# Patient Record
Sex: MALE | Race: BLACK OR AFRICAN AMERICAN | Employment: UNEMPLOYED | URBAN - METROPOLITAN AREA
[De-identification: names, ages, dates, MRNs, and addresses within clinical notes are randomized per-mention and may not be internally consistent; named-entity substitution may affect disease eponyms.]

---

## 2018-11-11 ENCOUNTER — RADIANT APPOINTMENT (OUTPATIENT)
Dept: GENERAL RADIOLOGY | Facility: CLINIC | Age: 23
End: 2018-11-11
Attending: FAMILY MEDICINE
Payer: COMMERCIAL

## 2018-11-11 ENCOUNTER — OFFICE VISIT (OUTPATIENT)
Dept: URGENT CARE | Facility: URGENT CARE | Age: 23
End: 2018-11-11
Payer: COMMERCIAL

## 2018-11-11 VITALS
HEART RATE: 76 BPM | TEMPERATURE: 98.1 F | WEIGHT: 244 LBS | SYSTOLIC BLOOD PRESSURE: 130 MMHG | DIASTOLIC BLOOD PRESSURE: 80 MMHG | BODY MASS INDEX: 40.6 KG/M2 | RESPIRATION RATE: 16 BRPM

## 2018-11-11 DIAGNOSIS — G89.29 WRIST PAIN, CHRONIC, LEFT: ICD-10-CM

## 2018-11-11 DIAGNOSIS — G89.29 WRIST PAIN, CHRONIC, LEFT: Primary | ICD-10-CM

## 2018-11-11 DIAGNOSIS — M25.532 WRIST PAIN, CHRONIC, LEFT: Primary | ICD-10-CM

## 2018-11-11 DIAGNOSIS — M25.532 WRIST PAIN, CHRONIC, LEFT: ICD-10-CM

## 2018-11-11 PROCEDURE — 99213 OFFICE O/P EST LOW 20 MIN: CPT | Performed by: FAMILY MEDICINE

## 2018-11-11 PROCEDURE — 73110 X-RAY EXAM OF WRIST: CPT | Mod: LT

## 2018-11-11 RX ORDER — NAPROXEN 500 MG/1
500 TABLET ORAL 2 TIMES DAILY PRN
Qty: 30 TABLET | Refills: 1 | Status: SHIPPED | OUTPATIENT
Start: 2018-11-11

## 2018-11-11 NOTE — PROGRESS NOTES
SUBJECTIVE:  Chief Complaint   Patient presents with     Wrist Injury     lt wrist pain for past month,no recent injury known     Jhonny Mishra is a 22 year old male who presents with a chief complaint of left wrist pain and tenderness.  Symptoms began 1 month(s) ago, are moderate and gradual onset and still present  Context:  Injury:No.  Pain exacerbated by flexion/extension Relieved by rest.  He treated it initially with no therapy. This is not the first time this type of injury has occurred to this patient as he had a fracture as a child    Past Medical History:   Diagnosis Date     Autism spectrum      Iron deficiency anemia, unspecified     Anemia, iron def..  Resolved 11.0 2/02.     Other developmental speech or language disorder     speech delay.  Normal hearing [facility].     Current Outpatient Prescriptions   Medication Sig Dispense Refill     Sertraline HCl (ZOLOFT PO) Take 150 mg by mouth daily       guaiFENesin-dextromethorphan (ROBITUSSIN DM) 100-10 MG/5ML syrup Take 10 mLs by mouth every 6 hours as needed for cough (Patient not taking: Reported on 11/11/2018) 200 mL 0     ibuprofen (ADVIL/MOTRIN) 800 MG tablet Take 1 tablet (800 mg) by mouth every 8 hours as needed for moderate pain (Patient not taking: Reported on 11/11/2018) 30 tablet 0     Social History   Substance Use Topics     Smoking status: Never Smoker     Smokeless tobacco: Never Used     Alcohol use Not on file     ROS:  CONSTITUTIONAL:NEGATIVE for fever, chills, change in weight  INTEGUMENTARY/SKIN: NEGATIVE for worrisome rashes, moles or lesions    EXAM:   /80 (Cuff Size: Adult Large)  Pulse 76  Temp 98.1  F (36.7  C) (Oral)  Resp 16  Wt 244 lb (110.7 kg)  BMI 40.6 kg/m2  M/S Exam:L wrist mild tenderness to palpation and FROM   GENERAL APPEARANCE: healthy, alert and no distress  EXTREMITIES: peripheral pulses normal  SKIN: no suspicious lesions or rashes  NEURO: Normal strength and tone, sensory exam grossly  normal, mentation intact and speech normal    X-RAY was done.  I have personally reviewed the images and find nothing acute.    Radiology to review xrays and I will communicate new findings     ASSESSMENT:  1. Wrist pain, chronic, left  Symptomatic cares were discussed in detail.   Pt instructed to come back to the clinic for worsening sx    - XR Wrist Left G/E 3 Views; Future  - naproxen (NAPROSYN) 500 MG tablet; Take 1 tablet (500 mg) by mouth 2 times daily as needed for moderate pain (with food)  Dispense: 30 tablet; Refill: 1  - order for DME; Equipment being ordered: wrist splint  Dispense: 1 each; Refill: 0

## 2018-11-11 NOTE — MR AVS SNAPSHOT
After Visit Summary   11/11/2018    Jhonny Mishra    MRN: [id number]           Patient Information     Date Of Birth          1995        Visit Information        Provider Department      11/11/2018 4:20 PM Michael Blanchard MD [facility]        Today's Diagnoses     Wrist pain, chronic, left    -  1       Follow-ups after your visit        Who to contact     If you have questions or need follow up information about today's clinic visit or your schedule please contact [facility] directly at [phone].  Normal or non-critical lab and imaging results will be communicated to you by MyChart, letter or phone within 4 business days after the clinic has received the results. If you do not hear from us within 7 days, please contact the clinic through MyChart or phone. If you have a critical or abnormal lab result, we will notify you by phone as soon as possible.  Submit refill requests through [employer] or call your pharmacy and they will forward the refill request to us. Please allow 3 business days for your refill to be completed.          Additional Information About Your Visit        Care EveryWhere ID     This is your Care EveryWhere ID. This could be used by other organizations to access your [place] medical records  [id number]        Your Vitals Were     Pulse Temperature Respirations BMI (Body Mass Index)          76 98.1  F (36.7  C) (Oral) 16 40.6 kg/m2         Blood Pressure from Last 3 Encounters:   11/11/18 130/80   12/17/16 92/48   10/03/14 119/77    Weight from Last 3 Encounters:   11/11/18 244 lb (110.7 kg)   12/17/16 234 lb 1.6 oz (106.2 kg) (98 %)*   03/26/14 231 lb 4.2 oz (104.9 kg) (>99 %)*     * Growth percentiles are based on CDC 2-20 Years data.                 Today's Medication Changes          These changes are accurate as of 11/11/18  6:25 PM.  If you have any questions, ask your nurse or doctor.                Start taking these medicines.        Dose/Directions    naproxen 500 MG tablet   Commonly known as:  NAPROSYN   Used for:  Wrist pain, chronic, left   Started by:  Michael Blancahrd MD        Dose:  500 mg   Take 1 tablet (500 mg) by mouth 2 times daily as needed for moderate pain (with food)   Quantity:  30 tablet   Refills:  1       order for DME   Used for:  Wrist pain, chronic, left   Started by:  Michael Blanchard MD        Equipment being ordered: wrist splint   Quantity:  1 each   Refills:  0            Where to get your medicines      These medications were sent to [facility] [address] [address]     Phone:  [phone]     naproxen 500 MG tablet         Some of these will need a paper prescription and others can be bought over the counter.  Ask your nurse if you have questions.     Bring a paper prescription for each of these medications     order for DME                Primary Care Provider Office Phone # Fax #    [facility] [phone]       [address],  [place] [address]        Equal Access to Services     [facility] : Hadii cynthia ku hadasho Soomaali, waaxda luqadaha, qaybta kaalmada adeegyada, sharmila stearns haysindhu carrero . So [facility] [phone].    ATENCIÓN: Si habla español, tiene a linder disposición servicios gratuitos de asistencia lingüística. Llame al [phone].    We comply with applicable federal civil rights laws and [place] laws. We do not discriminate on the basis of race, color, national origin, age, disability, sex, sexual orientation, or gender identity.            Thank you!     Thank you for choosing [facility]  for your care. Our goal is always to provide you with excellent care. Hearing back from our patients is one way we can continue to improve our services. Please take a few minutes to complete the written  survey that you may receive in the mail after your visit with us. Thank you!             Your Updated Medication List - Protect others around you: Learn how to safely use, store and throw away your medicines at www.disposemymeds.org.          This list is accurate as of 11/11/18  6:25 PM.  Always use your most recent med list.                   Brand Name Dispense Instructions for use Diagnosis    guaiFENesin-dextromethorphan 100-10 MG/5ML syrup    ROBITUSSIN DM    200 mL    Take 10 mLs by mouth every 6 hours as needed for cough        ibuprofen 800 MG tablet    ADVIL/MOTRIN    30 tablet    Take 1 tablet (800 mg) by mouth every 8 hours as needed for moderate pain    Dog bite of wrist, right, initial encounter       naproxen 500 MG tablet    NAPROSYN    30 tablet    Take 1 tablet (500 mg) by mouth 2 times daily as needed for moderate pain (with food)    Wrist pain, chronic, left       order for DME     1 each    Equipment being ordered: wrist splint    Wrist pain, chronic, left       ZOLOFT PO      Take 150 mg by mouth daily

## 2020-01-09 ENCOUNTER — HOSPITAL ENCOUNTER (EMERGENCY)
Facility: CLINIC | Age: 25
Discharge: HOME | End: 2020-01-09
Attending: EMERGENCY MEDICINE | Admitting: EMERGENCY MEDICINE
Payer: COMMERCIAL

## 2020-01-09 VITALS
SYSTOLIC BLOOD PRESSURE: 139 MMHG | OXYGEN SATURATION: 98 % | WEIGHT: 250 LBS | HEART RATE: 93 BPM | RESPIRATION RATE: 18 BRPM | DIASTOLIC BLOOD PRESSURE: 90 MMHG | BODY MASS INDEX: 41.6 KG/M2 | TEMPERATURE: 98.2 F

## 2020-01-09 DIAGNOSIS — H61.21 IMPACTED CERUMEN OF RIGHT EAR: ICD-10-CM

## 2020-01-09 PROCEDURE — 69209 REMOVE IMPACTED EAR WAX UNI: CPT | Mod: RT

## 2020-01-09 PROCEDURE — 99283 EMERGENCY DEPT VISIT LOW MDM: CPT

## 2020-01-09 RX ORDER — IBUPROFEN 600 MG/1
600 TABLET, FILM COATED ORAL EVERY 6 HOURS PRN
Qty: 30 TABLET | Refills: 1 | Status: SHIPPED | OUTPATIENT
Start: 2020-01-09

## 2020-01-09 RX ORDER — NEOMYCIN SULFATE, POLYMYXIN B SULFATE, HYDROCORTISONE 3.5; 10000; 1 MG/ML; [USP'U]/ML; MG/ML
3 SOLUTION/ DROPS AURICULAR (OTIC) 4 TIMES DAILY
Qty: 10 ML | Refills: 0 | Status: SHIPPED | OUTPATIENT
Start: 2020-01-09 | End: 2020-01-14

## 2020-01-09 ASSESSMENT — ENCOUNTER SYMPTOMS
COUGH: 1
FEVER: 0

## 2020-01-09 NOTE — ED AVS SNAPSHOT
[facility] Emergency Department  [address]  [facility] [address]  Phone:  [phone]  Fax:  [phone]                                    Jhonny Mishra   MRN: [id number]    Department:  [facility] Emergency Department   Date of Visit:  1/9/2020           After Visit Summary Signature Page    I have received my discharge instructions, and my questions have been answered. I have discussed any challenges I see with this plan with the nurse or doctor.    ..........................................................................................................................................  Patient/Patient Representative Signature      ..........................................................................................................................................  Patient Representative Print Name and Relationship to Patient    ..................................................               ................................................  Date                                   Time    ..........................................................................................................................................  Reviewed by Signature/Title    ...................................................              ..............................................  Date                                               Time          22EPIC Rev 08/18

## 2020-01-10 NOTE — ED NOTES
Right ear irrigated using 1/4 hydrogen peroxide 3/4 warm water. Copious amounts of cerumen resulted. Ear canal clear now

## 2020-01-10 NOTE — ED PROVIDER NOTES
History     Chief Complaint:  Otalgia    HPI   Jhonny Mishra is a 24 year old male who presents with ear pain. The patient states that he has had intermittent right sided ear pain for a week. He endorses cough and congestion as well. The patient denies fever.     Allergies:  No Known Allergies     Medications:    Naproxyn    Past Medical History:    Autism  Anemia    Past Surgical History:    P.E. tubes  Tonsillectomy     Family History:    No past pertinent family history.    Social History:  Smoking Status: Never Smoker  Smokeless Tobacco: Never Used  Alcohol Use: No  Drug Use: No  PCP: [facility]  Marital Status:  Single     Review of Systems   Constitutional: Negative for fever.   HENT: Positive for congestion and ear pain.    Respiratory: Positive for cough.    All other systems reviewed and are negative.        Physical Exam     Patient Vitals for the past 24 hrs:   BP Temp Temp src Pulse Heart Rate Resp SpO2 Weight   01/09/20 2142 (!) 139/90 -- -- -- -- -- -- --   01/09/20 2141 -- 98.2  F (36.8  C) Oral 93 93 18 98 % 113.4 kg (250 lb)       Physical Exam  Constitutional:       Appearance: He is obese.   HENT:      Head: Normocephalic.      Right Ear: There is impacted cerumen.      Left Ear: Tympanic membrane normal.      Nose: Nose normal.      Mouth/Throat:      Mouth: Mucous membranes are moist.      Pharynx: No posterior oropharyngeal erythema.   Eyes:      Pupils: Pupils are equal, round, and reactive to light.   Cardiovascular:      Rate and Rhythm: Regular rhythm.   Pulmonary:      Effort: Pulmonary effort is normal.      Breath sounds: Normal breath sounds.   Neurological:      Mental Status: He is alert.             Emergency Department Course     Emergency Department Course:  Nursing notes and vitals reviewed.    2205 I performed an exam of the patient as documented above.     2241 I returned to update the patient about their plan for discharge.     Findings and plan  explained to the Patient. Patient discharged home with instructions regarding supportive care, medications, and reasons to return. The importance of close follow-up was reviewed. The patient was prescribed cortisporin and ibuprofen.     Impression & Plan      Medical Decision Making:  Jhonny Mishra is a 24 year old male who presents to the emergency department today for evaluation of right ear pain.  Examination is obscured by wax as patient is impacted cerumen on the right likely related to Q-tip use.  Patient's wax was removed in symptoms seem to improve after wax was removed eardrum is visualized and shows no signs of otitis media.  No concern for mastoiditis oropharyngeal exam is normal.  Patient is offered reassurance and a topical antibiotic drops for otitis externa and follow-up with primary care.  Also instructed not to use Q-tips in the ear canal as this is likely what is causing his impacted cerumen.      Diagnosis:    ICD-10-CM    1. Impacted cerumen of right ear H61.21        Disposition:   The patient is discharged to home.    Discharge Medications:  New Prescriptions    IBUPROFEN (ADVIL/MOTRIN) 600 MG TABLET    Take 1 tablet (600 mg) by mouth every 6 hours as needed for moderate pain    NEOMYCIN-POLYMYXIN-HYDROCORTISONE (CORTISPORIN) [id number] OTIC SOLUTION    Place 3 drops in ear(s) 4 times daily for 5 days       Scribe Disclosure:  Aletha ARAGON, am serving as a scribe at 10:04 PM on 1/9/2020 to document services personally performed by Dm Warner MD based on my observations and the provider's statements to me.   [facility] [place] [facility] EMERGENCY DEPARTMENT       Dm Warner MD  01/11/20 [id number]

## 2020-11-28 ENCOUNTER — APPOINTMENT (OUTPATIENT)
Dept: GENERAL RADIOLOGY | Facility: CLINIC | Age: 25
End: 2020-11-28
Attending: EMERGENCY MEDICINE
Payer: COMMERCIAL

## 2020-11-28 ENCOUNTER — HOSPITAL ENCOUNTER (EMERGENCY)
Facility: CLINIC | Age: 25
Discharge: HOME | End: 2020-11-28
Attending: EMERGENCY MEDICINE | Admitting: EMERGENCY MEDICINE
Payer: COMMERCIAL

## 2020-11-28 VITALS
WEIGHT: 250 LBS | BODY MASS INDEX: 41.65 KG/M2 | OXYGEN SATURATION: 95 % | TEMPERATURE: 98.6 F | SYSTOLIC BLOOD PRESSURE: 137 MMHG | RESPIRATION RATE: 16 BRPM | HEIGHT: 65 IN | HEART RATE: 85 BPM | DIASTOLIC BLOOD PRESSURE: 91 MMHG

## 2020-11-28 DIAGNOSIS — R07.9 CHEST PAIN, UNSPECIFIED TYPE: ICD-10-CM

## 2020-11-28 LAB
ALBUMIN SERPL-MCNC: 3.4 G/DL (ref 3.4–5)
ALP SERPL-CCNC: 57 U/L (ref 40–150)
ALT SERPL W P-5'-P-CCNC: 56 U/L (ref 0–70)
ANION GAP SERPL CALCULATED.3IONS-SCNC: 7 MMOL/L (ref 3–14)
AST SERPL W P-5'-P-CCNC: 33 U/L (ref 0–45)
BASOPHILS # BLD AUTO: 0 10E9/L (ref 0–0.2)
BASOPHILS NFR BLD AUTO: 0.6 %
BILIRUB SERPL-MCNC: 0.1 MG/DL (ref 0.2–1.3)
BUN SERPL-MCNC: 11 MG/DL (ref 7–30)
CALCIUM SERPL-MCNC: 9.1 MG/DL (ref 8.5–10.1)
CHLORIDE SERPL-SCNC: 109 MMOL/L (ref 94–109)
CO2 SERPL-SCNC: 24 MMOL/L (ref 20–32)
CREAT SERPL-MCNC: 0.72 MG/DL (ref 0.66–1.25)
DIFFERENTIAL METHOD BLD: ABNORMAL
EOSINOPHIL # BLD AUTO: 0.1 10E9/L (ref 0–0.7)
EOSINOPHIL NFR BLD AUTO: 1.4 %
ERYTHROCYTE [DISTWIDTH] IN BLOOD BY AUTOMATED COUNT: 14.3 % (ref 10–15)
GFR SERPL CREATININE-BSD FRML MDRD: >90 ML/MIN/{1.73_M2}
GLUCOSE SERPL-MCNC: 111 MG/DL (ref 70–99)
HCT VFR BLD AUTO: 37.3 % (ref 40–53)
HGB BLD-MCNC: 12.2 G/DL (ref 13.3–17.7)
IMM GRANULOCYTES # BLD: 0 10E9/L (ref 0–0.4)
IMM GRANULOCYTES NFR BLD: 0 %
INTERPRETATION ECG - MUSE: NORMAL
LIPASE SERPL-CCNC: 189 U/L (ref 73–393)
LYMPHOCYTES # BLD AUTO: 1.9 10E9/L (ref 0.8–5.3)
LYMPHOCYTES NFR BLD AUTO: 53.4 %
MCH RBC QN AUTO: 26.7 PG (ref 26.5–33)
MCHC RBC AUTO-ENTMCNC: 32.7 G/DL (ref 31.5–36.5)
MCV RBC AUTO: 82 FL (ref 78–100)
MONOCYTES # BLD AUTO: 0.3 10E9/L (ref 0–1.3)
MONOCYTES NFR BLD AUTO: 8.3 %
NEUTROPHILS # BLD AUTO: 1.3 10E9/L (ref 1.6–8.3)
NEUTROPHILS NFR BLD AUTO: 36.3 %
NRBC # BLD AUTO: 0 10*3/UL
NRBC BLD AUTO-RTO: 0 /100
PLATELET # BLD AUTO: 312 10E9/L (ref 150–450)
POTASSIUM SERPL-SCNC: 3.8 MMOL/L (ref 3.4–5.3)
PROT SERPL-MCNC: 7.2 G/DL (ref 6.8–8.8)
RBC # BLD AUTO: 4.57 10E12/L (ref 4.4–5.9)
SODIUM SERPL-SCNC: 140 MMOL/L (ref 133–144)
TROPONIN I SERPL-MCNC: <0.015 UG/L (ref 0–0.04)
WBC # BLD AUTO: 3.5 10E9/L (ref 4–11)

## 2020-11-28 PROCEDURE — 80053 COMPREHEN METABOLIC PANEL: CPT | Performed by: EMERGENCY MEDICINE

## 2020-11-28 PROCEDURE — 85025 COMPLETE CBC W/AUTO DIFF WBC: CPT | Performed by: EMERGENCY MEDICINE

## 2020-11-28 PROCEDURE — 83690 ASSAY OF LIPASE: CPT | Performed by: EMERGENCY MEDICINE

## 2020-11-28 PROCEDURE — 99285 EMERGENCY DEPT VISIT HI MDM: CPT | Mod: 25

## 2020-11-28 PROCEDURE — 71046 X-RAY EXAM CHEST 2 VIEWS: CPT

## 2020-11-28 PROCEDURE — 93005 ELECTROCARDIOGRAM TRACING: CPT

## 2020-11-28 PROCEDURE — 84484 ASSAY OF TROPONIN QUANT: CPT | Performed by: EMERGENCY MEDICINE

## 2020-11-28 ASSESSMENT — MIFFLIN-ST. JEOR: SCORE: 2060.87

## 2020-11-28 NOTE — ED TRIAGE NOTES
Arrives via EMS from home. Called due to onset of midsternal CP with no radiation. NO heart history. Resolved with 1 nitroglycerinfrom EMS and also got 324mg aspirin. No cough no fever no SOB, no sick contacts, no recent covid tests

## 2020-11-28 NOTE — ED NOTES
Walked patient to lobby and met his mother who was waiting there. Explained discharge paperwork and RX.

## 2020-11-28 NOTE — DISCHARGE INSTRUCTIONS
Take the prescribed antacid to treat any degree of irritation of your esophagus or stomach that may be causing these symptoms.  Fortunately, there are no signs of serious heart or lung problem or any other dangerous process at this time.  It is important that you get rechecked through clinic this coming week.

## 2020-11-28 NOTE — ED NOTES
Attempted to call patients parents. Women that answered the phone denied she was patient mother and stated that I had the wrong number. Apparently number in demographics is incorrect.

## 2020-11-28 NOTE — ED NOTES
Bed: ED10  Expected date:   Expected time:   Means of arrival:   Comments:  Katelyn 515 Chest pain 24 m

## 2020-11-28 NOTE — ED PROVIDER NOTES
"  History     Chief Complaint:  Chest Pain       HPI:   History submitted by electronic chart review  Jhonny Mishra is a 24 year old male who arrives via EMS with chest pain. Patient states that he woke up about 2 hours ago with mid sternal chest pain. His pain is non radiating and he was given 1 tablet of nitroglycerin and 324 mg aspirin en route. He does mention that he has had intermittent chest pain for quite a while and thinks it might be worse after eating, noting that he had lots of macaroni and cheese yesterday. Denies any nausea, vomiting, cough, fever. He does not work and lives at home with his parents. No known sick contacts.  No history of heart or lung problems.   He feels better now.    Allergies:  No known drug allergies    Medications:  Robitussin  Naproxen  Zoloft  Effexor  Metformin    Past Medical History:    Autism spectrum  Iron deficiency anemia  Speech/language disorder  Hyperlipidemia  Anxiety     Past Surgical History:    PE tubes  T & A  Left PE tube, paper patch myringoplasty with trichloracetic acid     Family History:    Hypertension  Heart disease  Arthritis   Diabetes   Psychiatric illness    Social History:  Smoking status: no  Alcohol use: no  Patient presents alone.  PCP: [facility]    Marital Status:  Single     Review of Systems   All other systems reviewed and are negative.    Physical Exam     Vitals:  Patient Vitals for the past 24 hrs:   BP Temp Temp src Pulse Resp SpO2 Height Weight   11/28/20 0918 (!) 137/91 98.6  F (37  C) Oral 85 16 95 % 1.651 m (5' 5\") 113.4 kg (250 lb)       Physical Exam   General: Nontoxic-appearing black male sitting upright in room 10  HENT: mucous membranes moist  CV: rate as above, regular rhythm, no lower extremity edema, no JVD, palpable symmetric radial pulses  Resp: normal effort, speaks in full phrases, no stridor, no cough observed  GI: abdomen soft and nontender, no guarding, negative Calvert's sign  MSK: no bony " tenderness to chest  Skin: appropriately warm and dry, no erythema or vesicles to chest wall  Neuro: alert, clear speech, oriented  Psych: cooperative, calm    Emergency Department Course     ECG (09:15:23):  Rate 78 bpm. DE interval 134. QRS duration 92. QT/QTc 382/435. P-R-T axes 29 35 68.    NSR. Nonspecific ST and T wave abnormality. J-pt elevation V2-V3. No STD.    Interpreted at 0920 by Manpreet Hayes MD.    Imaging:  XR Chest  There are no acute infiltrates. The cardiac silhouette is not enlarged. Pulmonary vasculature is unremarkable.   Per radiology.    Laboratory:  CBC: WBC 3.5 (L), HGB 12.2 (L),   CMP: Glucose 111 (H), Bilirubin 0.1 (L) o/w WNL (Creatinine 0.72)  Troponin (Collected 0924): <0.015  Lipase: 189     Emergency Department Course:  0916  Past medical records, nursing notes, and vitals reviewed. I performed an exam of the patient and obtained history, as documented above.    EKG was taken in the ED.     IV was inserted and blood was drawn for laboratory testing, results above.    The patient was sent for a chest XR while in the emergency department, results above.     I performed electronic chart review in [facility].  The patient was placed on continuous cardiac and pulse ox monitoring.    1037 Findings and plan explained to the Patient.     Patient discharged home with instructions regarding supportive care, medications, and reasons to return. The importance of close follow-up was reviewed. The patient was prescribed Prilosec.    I personally reviewed the laboratory & imaging results with the Patient who voiced understanding of the findings. I answered all related questions prior to discharge.     Impression & Plan   Medical Decision Making:  The correlation of his chest discomfort to eating does raise consideration for upper GI causes including esophagitis or gastritis along with hepatobiliary disease, pancreatitis, and others.  Acute coronary syndrome also considered, though he has  a negative troponin and is quite young for this diagnosis.  Do not suspect pericarditis.  He is PERC negative, making pulmonary embolism very unlikely.  His abdomen is completely soft.  He is asymptomatic at this time and declined any treatments.  He is comfortable to plan for discharge home and close outpatient follow-up, returning here in the unexpected event of sudden worsening.  He has a guardian listed in [facility], though we were unable to reach them by phone, though his mother did come to the emergency department to pick him up and his nurse connected with her at that time.  All involved parties are comfortable with this plan.    Diagnosis:    ICD-10-CM    1. Chest pain, unspecified type  R07.9         Disposition:  Discharged to home.     Discharge Medications:  Discharge Medication List as of 11/28/2020 10:39 AM      START taking these medications    Details   omeprazole (PRILOSEC) 20 MG DR capsule Take 1 capsule (20 mg) by mouth daily, Disp-30 capsule, R-0, Local Print             Scribe Disclosure:  I, Aaliyah Fitch, am serving as a scribe at 9:26 AM on 11/28/2020 to document services personally performed by Manpreet Hayes MD based on my observations and the provider's statements to me.       Aaliyah Fitch  11/28/2020    This note was completed in part using Dragon voice recognition software. Although reviewed after completion, some word and grammatical errors may occur.     Manpreet Hayes MD  11/28/20 4314

## 2022-03-25 ENCOUNTER — OFFICE VISIT (OUTPATIENT)
Dept: URGENT CARE | Facility: URGENT CARE | Age: 27
End: 2022-03-25
Payer: COMMERCIAL

## 2022-03-25 VITALS
BODY MASS INDEX: 40.64 KG/M2 | WEIGHT: 244.2 LBS | DIASTOLIC BLOOD PRESSURE: 94 MMHG | TEMPERATURE: 97.4 F | HEART RATE: 75 BPM | OXYGEN SATURATION: 99 % | SYSTOLIC BLOOD PRESSURE: 146 MMHG

## 2022-03-25 DIAGNOSIS — R07.0 THROAT PAIN: Primary | ICD-10-CM

## 2022-03-25 LAB
DEPRECATED S PYO AG THROAT QL EIA: NEGATIVE
GROUP A STREP BY PCR: NOT DETECTED

## 2022-03-25 PROCEDURE — 99203 OFFICE O/P NEW LOW 30 MIN: CPT | Performed by: NURSE PRACTITIONER

## 2022-03-25 PROCEDURE — 87651 STREP A DNA AMP PROBE: CPT | Performed by: NURSE PRACTITIONER

## 2022-03-25 RX ORDER — PENICILLIN V POTASSIUM 500 MG/1
500 TABLET, FILM COATED ORAL 2 TIMES DAILY
Qty: 20 TABLET | Refills: 0 | Status: SHIPPED | OUTPATIENT
Start: 2022-03-25

## 2022-03-25 NOTE — PROGRESS NOTES
Assessment & Plan     Throat pain  push fluids, rest as able.    Elevate HOB, lots of handwashing.    Toss your toothbrush after 24 hours on the antibiotics.  Gargle saltwater  F/u if persists or worsens.  - Streptococcus A Rapid Scr w Reflx to PCR - Lab Collect  - Group A Streptococcus PCR Throat Swab  - penicillin V (VEETID) 500 MG tablet  Dispense: 20 tablet; Refill: 0     Return in about 2 days (around 3/27/2022) for with regular provider if symptoms persist.    [facility] CAROL Barry is a 26 year old male who presents to clinic today for the following health issues:  Chief Complaint   Patient presents with     Pharyngitis     since Monday     Mass     HPI      URI Adult    Onset of symptoms was 4 day(s) ago.  Course of illness is worsening.    Severity moderately severe  Current and Associated symptoms: stuffy nose, sore throat and hoarse voice  Treatment measures tried include Decongestants, OTC Cough med, Fluids and Rest.  Predisposing factors include exposure to strep.  Raspy voice  Hard to swallow.  Breathing OK.  Denies SOB      Review of Systems  Constitutional, HEENT, cardiovascular, pulmonary, GI, , musculoskeletal, neuro, skin, endocrine and psych systems are negative, except as otherwise noted.      Objective    BP (!) 146/94 (BP Location: Right arm, Patient Position: Sitting, Cuff Size: Adult Regular)   Pulse 75   Temp 97.4  F (36.3  C) (Tympanic)   Wt 110.8 kg (244 lb 3.2 oz)   SpO2 99%   BMI 40.64 kg/m    Physical Exam   GENERAL: healthy, alert and no distress  EYES: Eyes grossly normal to inspection, PERRL and conjunctivae and sclerae normal  HENT: posterior pharynx swollen and erythematous no exudate.  ear canals and TM's normal, nose and mouth without ulcers or lesions  NECK: anterior cervical adenopathy, no asymmetry, masses, or scars and thyroid normal to palpation  RESP: lungs clear to auscultation - no rales, rhonchi  or wheezes  CV: regular rate and rhythm, normal S1 S2, no S3 or S4, no murmur, click or rub, no peripheral edema and peripheral pulses strong  MS: no gross musculoskeletal defects noted, no edema  SKIN: no suspicious lesions or rashes      Results for orders placed or performed in visit on 03/25/22   Streptococcus A Rapid Scr w Reflx to PCR - Lab Collect     Status: Normal    Specimen: Throat; Swab   Result Value Ref Range    Group A Strep antigen Negative Negative